# Patient Record
Sex: MALE
[De-identification: names, ages, dates, MRNs, and addresses within clinical notes are randomized per-mention and may not be internally consistent; named-entity substitution may affect disease eponyms.]

---

## 2020-06-17 PROBLEM — Z00.00 ENCOUNTER FOR PREVENTIVE HEALTH EXAMINATION: Status: ACTIVE | Noted: 2020-06-17

## 2020-06-18 ENCOUNTER — APPOINTMENT (OUTPATIENT)
Dept: ORTHOPEDIC SURGERY | Facility: CLINIC | Age: 35
End: 2020-06-18
Payer: OTHER MISCELLANEOUS

## 2020-06-18 VITALS — TEMPERATURE: 97.2 F

## 2020-06-18 VITALS — HEIGHT: 70 IN | BODY MASS INDEX: 32.93 KG/M2 | WEIGHT: 230 LBS

## 2020-06-18 VITALS — DIASTOLIC BLOOD PRESSURE: 90 MMHG | SYSTOLIC BLOOD PRESSURE: 149 MMHG | HEART RATE: 109 BPM

## 2020-06-18 DIAGNOSIS — R11.0 NAUSEA: ICD-10-CM

## 2020-06-18 PROCEDURE — 99204 OFFICE O/P NEW MOD 45 MIN: CPT

## 2020-06-18 NOTE — HISTORY OF PRESENT ILLNESS
[de-identified] : Patient is here for concussion evaluation. 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 He lost consciousness. He went to [facility] ER. He had staples put in his head. He has headache, dizziness, nausea, photophobia, misophonia, difficulty concentrating, increased fatigue, and an overall feeling of being out of it.  He is not taking pain medication. The pain is located on the posterior aspect of his head and on the outside of his right eye. He had an episode of vomiting this AM that consisted mainly of bile. Denies prior concussion.  He has not returned to work.\par \par I have personally reviewed today's intake form which details the patient's concussion history and symptoms at this time.\par \par The patient's past medical history, past surgical history, medications and allergies were reviewed by me today and documented accordingly. In addition, the patient's family and social history, which were noncontributory to this visit, were reviewed also. Intake form was reviewed.  The patient has no family history of arthritis.

## 2020-06-18 NOTE — DISCUSSION/SUMMARY
[de-identified] : Discussed findings of today's exam and possible causes of patient's pain.  Educated patient on their most probable diagnosis of concussion.  Reviewed possible courses of treatment, and we collaboratively decided best course of treatment at this time will include conservative management and continued rest. Patient is still very symptomatic at this time, with positive provocative testing on assessment today.  Advised the patient that continued physical and cognitive rest is indicated.  At this time recommend a course of oral prednisone with an appropriate taper (We discussed all possible side effects of this medication), patient advised to not take oral NSAIDs while on prednisone.  Patient will also be started on a course of oral antiemetic, prescription sent to the pharmacy for Zofran.  Patient has significant vestibular ocular deficits, recommend a course of vestibular therapy to address these dysfunctions.  Patient also has had pain with cervicothoracic paraspinal muscle spasm secondary to whiplash injury status post fall, recommend a course of physical therapy to address these dysfunctions.  Patient is complaining of other body parts that are bothering him, however these were not confirmed on his Worker's Compensation case, he is advised to notify his employer of these other injuries and I am happy to assess them once confirmed at future visits.  Patient is unable to return to work at this time due to his severe symptoms.  I recommend follow up in 1-2 weeks to reassess.  Patient is advised that I am a sports medicine provider and deal primarily with sport related concussion, if he continues to have severe symptoms and does not respond to the above treatment measures I would recommend neurology consultation at that time for further management of his concussion.  Patient appreciates and agrees with the current plan.\par \par This note was generated using dragon medical dictation software.  A reasonable effort has been made for proofreading its contents, but typos may still remain.  If there are any questions or points of clarification needed please notify my office.\par

## 2020-06-18 NOTE — PHYSICAL EXAM
[de-identified] : Constitutional: Well-nourished, well-developed, No acute distress\par Respiratory:  Good respiratory effort, no SOB\par Lymphatic: No regional lymphadenopathy, no lymphedema\par Psychiatric: Pleasant and normal affect, alert and oriented x3\par Skin: Clean dry and intact B/L UE/LE\par Musculoskeletal: normal except where as noted in regional exam\par \par Cervical Spine Exam\par APPEARANCE: no marked deformities or malalignment, normal curvature, good posture\par POSITIVE TENDERNESS: + Bilateral upper trapezius, levator scapula, rhomboid major, and rhomboid minor, + spasm noted in the same muscles.\par NONTENDER: no bony midline tenderness.\par ROM: limited in all planes, most notably in flexion and sidebending due to pain\par RESISTIVE TESTING: painful 4/5 resisted ext, bilateral sidebending, rotation and shoulder shrug , 5/5 flexion \par SPECIAL TESTS: neg Spurling's b/l\par Vasc: 2+ radial pulse b/l\par Neuro: C5 - T1 intact to motor, DTRs 2+/4 biceps, triceps, brachioradialis\par Sensation: Intact to light touch throughout b/l UE\par \par Thoracic Spine Exam:\par \par normal curvature and normal alignment. good posture. no midline bony tenderness, + marked spasm and associated tenderness of bilateral paraspinal and periscapular muscles.  ROM mildly limited in sidebending and rotation due to noted spasm\par \par Neuro:  + finger-to-nose testing, + Romberg, + balance error testing \par \par Vestibular-occular testing:  \par Horizontal Nystagmus:  Negative\par Vertical Nystagmus:  Negative\par Smooth Pursuit:  Abnormal\par Accommodation/Convergence:  NL, but + for reproduction of symptoms\par Thumb held out in front of face, head turn with eyes focused: + for reproduction of symptoms\par Hands held out in front with thumbs/hands locked together, trunk rotation with head fixed: + for reproduction of symptoms\par

## 2020-06-18 NOTE — RETURN TO WORK/SCHOOL
[FreeTextEntry1] : Aidan was seen today for evaluation of concussion and head/neck pain status post fall.  He is unable to return to work at this time and cannot travel to work.  He is being started on a course of treatment.  Please excuse him from work until reevaluated in 2 weeks.\par Thank you for your understanding.\par \par Sincerely,\par \par Noe Carbajal DO, ATC\par Primary Care Sports Medicine\par [facility] [place]\par

## 2020-07-02 ENCOUNTER — APPOINTMENT (OUTPATIENT)
Dept: ORTHOPEDIC SURGERY | Facility: CLINIC | Age: 35
End: 2020-07-02
Payer: OTHER MISCELLANEOUS

## 2020-07-02 VITALS — TEMPERATURE: 96.2 F

## 2020-07-02 DIAGNOSIS — R42 DIZZINESS AND GIDDINESS: ICD-10-CM

## 2020-07-02 PROCEDURE — 99214 OFFICE O/P EST MOD 30 MIN: CPT

## 2020-07-02 NOTE — RETURN TO WORK/SCHOOL
[FreeTextEntry1] : Aidan was seen today for reevaluation of his concussion.  He is still unable to travel to or perform any of his work duties due to the severity of his symptoms.  With the severity and multitude of his symptoms he is being transitioned to the care of a neurologist and a consultation is being arranged for him at this time.  He will remain out of work for at least the next 2 weeks while we obtain this neurology consultation for further management.\par Thank you for your understanding.\par \par Sincerely,\par \par Noe Carbajal DO, ATC\par Primary Care Sports Medicine\par [facility] [place]\par

## 2020-07-02 NOTE — DISCUSSION/SUMMARY
[de-identified] : Patient was seen today for reevaluation of concussion with cervical paraspinal muscle spasm secondary to work comp injury.  Patient is having a multitude of postconcussive symptoms including sensitivity to light, dizziness, nausea, and anxiety.  Patient is also newly developed black spots in his vision potentially development of floaters.  With his multitude of symptoms at this time I recommend an MRI of the head to evaluate for intracranial pathology, as well as MRA of the head/neck in order to rule out vertebral artery injury.  With the severity of the symptoms we discussed the risks/benefits of treating with oral corticosteroids, and patient would like to proceed with medication regimen at this time.  Patient has been prescribed a course of oral dexamethasone with an appropriate taper (We discussed all possible side effects of this medication), patient advised to not take oral NSAIDs while on prednisone.  Patient will continue his course of physical and vestibular therapy to address his multitude of pathology/symptoms.  At this time patient will also be transitioned to the care of a neurologist, he was advised that I am a sports medicine physician and I primarily manage sport related concussion, with this Worker's Compensation injury and the multitude and severity of his symptoms he would best be managed by a neurologist for continue management.  I can see the patient for follow-up of neck and back pain due to whiplash injury as needed.  Patient may follow-up over the phone regarding MRI/MRI results as soon as available, and he may review the results of the scans with his neurologist as well.  Patient will remain out of work at this time until assessed by neurology.  Patient appreciates and agrees with current plan.\par \par This note was generated using dragon medical dictation software.  A reasonable effort has been made for proofreading its contents, but typos may still remain.  If there are any questions or points of clarification needed please notify my office.

## 2020-07-02 NOTE — PHYSICAL EXAM
[de-identified] : Constitutional: Well-nourished, well-developed, No acute distress\par Respiratory:  Good respiratory effort, no SOB\par Lymphatic: No regional lymphadenopathy, no lymphedema\par Psychiatric: Pleasant and normal affect, alert and oriented x3\par Skin: Clean dry and intact B/L UE/LE\par Musculoskeletal: normal except where as noted in regional exam\par \par Cervical Spine Exam\par APPEARANCE: no marked deformities or malalignment, normal curvature, good posture\par POSITIVE TENDERNESS: + Bilateral upper trapezius, levator scapula, rhomboid major, and rhomboid minor, + spasm noted in the same muscles.\par NONTENDER: no bony midline tenderness.\par ROM: limited in all planes, most notably in flexion and sidebending due to pain\par RESISTIVE TESTING: painful 4/5 resisted ext, bilateral sidebending, rotation and shoulder shrug , 5/5 flexion \par SPECIAL TESTS: neg Spurling's b/l\par Vasc: 2+ radial pulse b/l\par Neuro: C5 - T1 intact to motor, DTRs 2+/4 biceps, triceps, brachioradialis\par Sensation: Intact to light touch throughout b/l UE\par \par Thoracic Spine Exam:\par \par normal curvature and normal alignment. good posture. no midline bony tenderness, + marked spasm and associated tenderness of bilateral paraspinal and periscapular muscles.  ROM mildly limited in sidebending and rotation due to noted spasm\par \par Neuro:  + finger-to-nose testing, + Romberg, + balance error testing \par \par Vestibular-occular testing:  \par Horizontal Nystagmus:  Negative\par Vertical Nystagmus:  Negative\par Smooth Pursuit:  Abnormal\par Accommodation/Convergence:  NL, but + for reproduction of symptoms\par Thumb held out in front of face, head turn with eyes focused: + for reproduction of symptoms\par Hands held out in front with thumbs/hands locked together, trunk rotation with head fixed: + for reproduction of symptoms\par

## 2020-07-02 NOTE — HISTORY OF PRESENT ILLNESS
[de-identified] : Patient is here for concussion follow up. He has attended PT/VT and his therapist reached out regarding concerning findings. He continues to have severe symptoms.  Patient has extreme sensitivity to light, nausea, dizziness, difficulty concentrating, as well as increased anxiety regarding his condition.  Patient is very concerned that he will not be able to get back to work with his multitude of symptoms.  Since last valuation patient has intermittently had development of black spots in his vision.  There has been no further injury. \par I have personally reviewed today's intake form which details the patient's concussion history and symptoms at this time.

## 2020-07-16 ENCOUNTER — APPOINTMENT (OUTPATIENT)
Dept: ORTHOPEDIC SURGERY | Facility: CLINIC | Age: 35
End: 2020-07-16
Payer: OTHER MISCELLANEOUS

## 2020-07-16 ENCOUNTER — FORM ENCOUNTER (OUTPATIENT)
Age: 35
End: 2020-07-16

## 2020-07-16 VITALS — TEMPERATURE: 96.5 F

## 2020-07-16 DIAGNOSIS — M62.838 OTHER MUSCLE SPASM: ICD-10-CM

## 2020-07-16 PROCEDURE — 99214 OFFICE O/P EST MOD 30 MIN: CPT

## 2020-07-16 NOTE — HISTORY OF PRESENT ILLNESS
[de-identified] : Patient is here for concussion follow up. He has a scheduled appointment with Neurology. He has not gone for his imaging studies and does not recall being told that he needed to have imaging done at his last visit. He has continued attending vestibular therapy. He took the steroids as recommended which has helped with nausea. He continues to have visual disturbances. \par I have personally reviewed today's intake form which details the patient's concussion history and symptoms at this time.

## 2020-07-16 NOTE — PHYSICAL EXAM
[de-identified] : Constitutional: Well-nourished, well-developed, No acute distress\par Respiratory:  Good respiratory effort, no SOB\par Lymphatic: No regional lymphadenopathy, no lymphedema\par Psychiatric: Pleasant and normal affect, alert and oriented x3\par Skin: Clean dry and intact B/L UE/LE\par Musculoskeletal: normal except where as noted in regional exam\par \par Cervical Spine Exam\par APPEARANCE: no marked deformities or malalignment, normal curvature, good posture\par POSITIVE TENDERNESS: + Bilateral upper trapezius, levator scapula, rhomboid major, and rhomboid minor, + spasm noted in the same muscles.\par NONTENDER: no bony midline tenderness.\par ROM: limited in all planes, most notably in flexion and sidebending due to pain\par RESISTIVE TESTING: painful 4/5 resisted ext, bilateral sidebending, rotation and shoulder shrug , 5/5 flexion \par SPECIAL TESTS: neg Spurling's b/l\par Vasc: 2+ radial pulse b/l\par Neuro: C5 - T1 intact to motor, DTRs 2+/4 biceps, triceps, brachioradialis\par Sensation: Intact to light touch throughout b/l UE\par \par Thoracic Spine Exam:\par \par normal curvature and normal alignment. good posture. no midline bony tenderness, + marked spasm and associated tenderness of bilateral paraspinal and periscapular muscles.  ROM mildly limited in sidebending and rotation due to noted spasm\par \par Neuro:  + finger-to-nose testing, + Romberg, + balance error testing \par \par Vestibular-occular testing:  \par Horizontal Nystagmus:  Negative\par Vertical Nystagmus:  Negative\par Smooth Pursuit:  Abnormal\par Accommodation/Convergence:  NL, but + for reproduction of symptoms\par Thumb held out in front of face, head turn with eyes focused: + for reproduction of symptoms\par Hands held out in front with thumbs/hands locked together, trunk rotation with head fixed: + for reproduction of symptoms\par

## 2020-07-16 NOTE — DISCUSSION/SUMMARY
[de-identified] : Patient was seen today for reevaluation and continued management of concussion with cervical paraspinal muscle spasm.  Patient continues to have significant symptoms.  He had some temporary mild relief with dexamethasone, but continues to have headache, neck stiffness, thoracic back pain, as well as intermittent floaters in his vision.  Patient has neurology consultation scheduled for 7/24/20, this provider will assume care of his concussion and further management of his related issues.  We are still awaiting Worker's Compensation authorization for MRI of the head and MRA of the neck, I advised the patient Worker's Comp. has upwards of 30 days to authorize imaging requests, but hopefully we will obtain these imaging studies prior to neurology consultation so he can review the results and discuss appropriate treatment recommendations based on findings with a neurologist next week.  Patient will continue his course of physical therapy.  Patient is still unable to return to work due to his audible medical issues that are ongoing.  Patient will continue his course of physical therapy to address cervical paraspinal muscle spasm.  Patient is advised I can see him back in 2 weeks for reassessment of his musculoskeletal issues, but he would be recommended to continue with the neurologist for further management of his concussion, dizziness, visual floaters, and other related issues.  Patient was given written instructions regarding all of these aspects of his treatment plan so he fully understood the plan today.  Patient appreciates and agrees with current plan.\par \par This note was generated using dragon medical dictation software.  A reasonable effort has been made for proofreading its contents, but typos may still remain.  If there are any questions or points of clarification needed please notify my office.

## 2020-07-16 NOTE — RETURN TO WORK/SCHOOL
[FreeTextEntry1] : Aidan was seen today for reevaluation of concussion, and cervical neck injury.  He is still unable to return to work at this time.  He will be reevaluated in 2 weeks.\par Thank you for your understanding.\par \par Sincerely,\par \par Noe Carbajal DO, ATC\par Primary Care Sports Medicine\par [facility] [place]\par

## 2020-07-24 ENCOUNTER — APPOINTMENT (OUTPATIENT)
Dept: NEUROLOGY | Facility: CLINIC | Age: 35
End: 2020-07-24
Payer: OTHER MISCELLANEOUS

## 2020-07-24 VITALS
BODY MASS INDEX: 34.07 KG/M2 | HEART RATE: 107 BPM | SYSTOLIC BLOOD PRESSURE: 142 MMHG | HEIGHT: 70 IN | WEIGHT: 238 LBS | DIASTOLIC BLOOD PRESSURE: 93 MMHG

## 2020-07-24 VITALS — TEMPERATURE: 97 F

## 2020-07-24 PROCEDURE — 99243 OFF/OP CNSLTJ NEW/EST LOW 30: CPT

## 2020-07-24 NOTE — HISTORY OF PRESENT ILLNESS
[FreeTextEntry1] : 35-year-old man provides history of a work related accident that occurred on June 4, 2020. 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 He was brought by ambulance to [facility] where a scalp laceration was sutured and he was discharged home. He was complaining of headaches afterwards and went to urgent care "[facility]" He returned to [facility] to have the staples removed one week after the accident. He claims he had brain imaging not sure if it was CT scan or MRI and was told "it was normal". No records from [facility] are currently available. Dr. Carbajal referred him for physical therapy for his neck and back pain. He has gained 8 pounds since his accident and wakes up feeling tired. He continues to have daily headaches and complains of hypersensitivity to light and "floaters". He complains of being forgetful. He complains of pain in his left hand claiming that was the hand he fell on. He denies any previous head injuries or concussions.

## 2020-07-24 NOTE — PHYSICAL EXAM
[FreeTextEntry1] : He is alert and oriented to month but not date. Short-term recall is markedly impaired being unable to recall any of 3 words after 3 minutes. He is able to spell but not reverse spelling of a 5 letter word. He was unable to calculate the number of nickels that equal one dollar. Visual fields are full to confrontation. Pupils are small, but constrict to light. He is unable to tolerate funduscopic examination because of photosensitivity. Extraocular movements are intact. He has diminished hearing to finger rub in the left ear. Ray radiates to the right ear. He has diminished vibration to a tuning fork placed on the left forehead compared to the right forehead. There is no weakness or atrophy of the limbs. Tendon reflexes are active and symmetrical plantar responses are flexor. Gait and coordination intact. Romberg negative.

## 2020-07-24 NOTE — REVIEW OF SYSTEMS
[Anxiety] : anxiety [As Noted in HPI] : as noted in HPI [Loss Of Hearing] : hearing loss [Negative] : Endocrine

## 2020-07-24 NOTE — CONSULT LETTER
[Dear  ___] : Dear  [unfilled], [Consult Letter:] : I had the pleasure of evaluating your patient, [unfilled]. [FreeTextEntry2] : Noe Carbajal, DO

## 2020-07-24 NOTE — ASSESSMENT
[FreeTextEntry1] : His cognitive impairment is out of proportion to the history obtained. He has findings on examination of a nonphysiologic sensory loss.\par \par I advised that he obtain authorization from his workers compensation carrier to undergo formal neuropsychological testing.\par \par He was told to bring in old records that were obtained at [facility] for my review. I find it unusual that after 2 months of physical therapy his symptoms have not improved.\par \par He has gained weight and wakes up feeling tired. There may be a component of sleep apnea contributing to his chronic headaches.\par \par He will return for followup in 2 months.

## 2020-07-27 ENCOUNTER — FORM ENCOUNTER (OUTPATIENT)
Age: 35
End: 2020-07-27

## 2020-07-28 ENCOUNTER — FORM ENCOUNTER (OUTPATIENT)
Age: 35
End: 2020-07-28

## 2020-07-30 ENCOUNTER — APPOINTMENT (OUTPATIENT)
Dept: ORTHOPEDIC SURGERY | Facility: CLINIC | Age: 35
End: 2020-07-30
Payer: OTHER MISCELLANEOUS

## 2020-07-30 VITALS — TEMPERATURE: 97.6 F

## 2020-07-30 PROCEDURE — 99213 OFFICE O/P EST LOW 20 MIN: CPT

## 2020-07-30 NOTE — DISCUSSION/SUMMARY
[de-identified] : Patient was seen today for reevaluation of persistent neck pain status post fall at work.  Since last evaluation patient was seen by neurology, he was advised to obtain emergency room records for the neurologist to review, and he is also recommended to undergo neuropsychological evaluation.  Patient has continued physical therapy since last evaluation, however he has little interval improvement with therapy thus far.  We obtained insurance authorization for MRI/MRA of the neck this week, patient was emailed the prescriptions, but could not open the email, so they will be provided to him by hand on paper today.  Patient is advised he should schedule these diagnostic studies for further evaluation.  Patient is advised that I am a sports medicine provider, I do not routinely manage prolonged concussion symptoms secondary to Worker's Compensation injury, he be recommended to continue management with his neurologist for this issue.  Depending on MRI results of the cervical spine patient may benefit from physiatry consultation for continued management of cervical neck pain without improvement with conservative therapies thus far.  If there are any surgical indications based on MRI results patient will be given a referral to my [facility].  Patient is advised he would likely need transition of his care to either physiatry and/or neurology for continue management of these chronic problems, his issues and the chronicity of his pain is beyond the scope of my sports medicine practice.  Patient appreciates and agrees with current plan.\par \par This note was generated using dragon medical dictation software.  A reasonable effort has been made for proofreading its contents, but typos may still remain.  If there are any questions or points of clarification needed please notify my office.

## 2020-07-30 NOTE — PHYSICAL EXAM
[de-identified] : Constitutional: Well-nourished, well-developed, No acute distress\par Respiratory:  Good respiratory effort, no SOB\par Lymphatic: No regional lymphadenopathy, no lymphedema\par Psychiatric: Pleasant and normal affect, alert and oriented x3\par Skin: Clean dry and intact B/L UE/LE\par Musculoskeletal: normal except where as noted in regional exam\par \par Cervical Spine Exam\par APPEARANCE: no marked deformities or malalignment, normal curvature, good posture\par POSITIVE TENDERNESS: + Bilateral upper trapezius, levator scapula, rhomboid major, and rhomboid minor, + spasm noted in the same muscles.\par NONTENDER: no bony midline tenderness.\par ROM: limited in all planes, most notably in flexion and sidebending due to pain\par RESISTIVE TESTING: painful 4/5 resisted ext, bilateral sidebending, rotation and shoulder shrug , 5/5 flexion \par SPECIAL TESTS: neg Spurling's b/l\par Vasc: 2+ radial pulse b/l\par Neuro: C5 - T1 intact to motor, DTRs 2+/4 biceps, triceps, brachioradialis\par Sensation: Intact to light touch throughout b/l UE\par \par Thoracic Spine Exam:\par \par normal curvature and normal alignment. good posture. no midline bony tenderness, + marked spasm and associated tenderness of bilateral paraspinal and periscapular muscles.  ROM mildly limited in sidebending and rotation due to noted spasm\par \par

## 2020-07-30 NOTE — RETURN TO WORK/SCHOOL
[FreeTextEntry1] : Aidan was seen today for re-evaluation of neck pain.  We have received authorization for his MRI/MRA of the neck for further evaluation of his injury.  He will obtain this study and follow up to discuss results and next steps in his treatment.  He is unable to return to work at this time and remains 100% temporarily disabled since time of injury.\par Thank you for your understanding.\par \par Sincerely,\par \par Noe Carbajal DO, ATC\par Primary Care Sports Medicine\par [facility] [place]\par

## 2020-07-30 NOTE — HISTORY OF PRESENT ILLNESS
[de-identified] : Patient is here for neck pain follow up. He has been attending PT. He states that he tripped over a rock while walking and exacerbated his symptoms. He does not recall when this fall occurred. He is in the process of scheduling his MRA. He saw the neurologist last week.

## 2020-08-13 ENCOUNTER — OUTPATIENT (OUTPATIENT)
Dept: OUTPATIENT SERVICES | Facility: HOSPITAL | Age: 35
LOS: 1 days | End: 2020-08-13
Payer: COMMERCIAL

## 2020-08-13 ENCOUNTER — RESULT REVIEW (OUTPATIENT)
Age: 35
End: 2020-08-13

## 2020-08-13 ENCOUNTER — APPOINTMENT (OUTPATIENT)
Dept: MRI IMAGING | Facility: CLINIC | Age: 35
End: 2020-08-13
Payer: OTHER MISCELLANEOUS

## 2020-08-13 ENCOUNTER — APPOINTMENT (OUTPATIENT)
Dept: ORTHOPEDIC SURGERY | Facility: CLINIC | Age: 35
End: 2020-08-13
Payer: OTHER MISCELLANEOUS

## 2020-08-13 DIAGNOSIS — M54.2 CERVICALGIA: ICD-10-CM

## 2020-08-13 DIAGNOSIS — S06.0X9A CONCUSSION WITH LOSS OF CONSCIOUSNESS OF UNSPECIFIED DURATION, INITIAL ENCOUNTER: ICD-10-CM

## 2020-08-13 DIAGNOSIS — R42 DIZZINESS AND GIDDINESS: ICD-10-CM

## 2020-08-13 PROCEDURE — 70551 MRI BRAIN STEM W/O DYE: CPT | Mod: 26

## 2020-08-13 PROCEDURE — 99213 OFFICE O/P EST LOW 20 MIN: CPT

## 2020-08-13 PROCEDURE — 70549 MR ANGIOGRAPH NECK W/O&W/DYE: CPT | Mod: 26

## 2020-08-13 PROCEDURE — 70551 MRI BRAIN STEM W/O DYE: CPT

## 2020-08-13 PROCEDURE — 70549 MR ANGIOGRAPH NECK W/O&W/DYE: CPT

## 2020-08-13 PROCEDURE — A9585: CPT

## 2020-08-13 RX ORDER — ONDANSETRON 4 MG/1
4 TABLET ORAL TWICE DAILY
Qty: 14 | Refills: 0 | Status: DISCONTINUED | COMMUNITY
Start: 2020-06-18 | End: 2020-08-13

## 2020-08-13 RX ORDER — PREDNISONE 5 MG/1
5 TABLET ORAL
Qty: 30 | Refills: 0 | Status: DISCONTINUED | COMMUNITY
Start: 2020-06-18 | End: 2020-08-13

## 2020-08-13 RX ORDER — DEXAMETHASONE 4 MG/1
4 TABLET ORAL
Qty: 6 | Refills: 0 | Status: DISCONTINUED | COMMUNITY
Start: 2020-07-02 | End: 2020-08-13

## 2020-08-26 ENCOUNTER — APPOINTMENT (OUTPATIENT)
Dept: PHYSICAL MEDICINE AND REHAB | Facility: CLINIC | Age: 35
End: 2020-08-26
Payer: OTHER MISCELLANEOUS

## 2020-08-26 VITALS
TEMPERATURE: 95.5 F | HEIGHT: 70 IN | BODY MASS INDEX: 33.5 KG/M2 | HEART RATE: 112 BPM | WEIGHT: 234 LBS | DIASTOLIC BLOOD PRESSURE: 94 MMHG | OXYGEN SATURATION: 96 % | SYSTOLIC BLOOD PRESSURE: 150 MMHG

## 2020-08-26 DIAGNOSIS — Z78.9 OTHER SPECIFIED HEALTH STATUS: ICD-10-CM

## 2020-08-26 DIAGNOSIS — Z80.9 FAMILY HISTORY OF MALIGNANT NEOPLASM, UNSPECIFIED: ICD-10-CM

## 2020-08-26 PROCEDURE — 99204 OFFICE O/P NEW MOD 45 MIN: CPT

## 2020-08-26 RX ORDER — CYCLOBENZAPRINE HYDROCHLORIDE 10 MG/1
10 TABLET, FILM COATED ORAL
Qty: 30 | Refills: 0 | Status: ACTIVE | COMMUNITY
Start: 2020-08-26 | End: 1900-01-01

## 2020-08-26 RX ORDER — CELECOXIB 200 MG/1
200 CAPSULE ORAL TWICE DAILY
Qty: 60 | Refills: 2 | Status: ACTIVE | COMMUNITY
Start: 2020-08-26 | End: 1900-01-01

## 2020-08-27 PROBLEM — Z78.9 NO PERTINENT PAST MEDICAL HISTORY: Status: RESOLVED | Noted: 2020-08-26 | Resolved: 2020-08-27

## 2020-08-27 PROBLEM — Z80.9 FAMILY HISTORY OF MALIGNANT NEOPLASM: Status: ACTIVE | Noted: 2020-08-26

## 2020-08-27 NOTE — HISTORY OF PRESENT ILLNESS
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 Since that day has had headaches, floaters, and back pain from low back to the neck. Pain is 7-9/10, intermittent, aching pain, radiates to the left arm, associated with paresthesia in the LUE, aggravated by lifting, carrying, pushing, pulling alleviated by rest. Is in PT currently for neck pain which has been helping. No imaging done for cervical spine. \par \aleah Went to hospital ([place]) sent home after no life threatening injuries found.

## 2020-08-27 NOTE — ASSESSMENT
[FreeTextEntry1] : No improvement in pain despite over 6 weeks of physician guided conservative management including PT and oral NSAIDs, will need MRI now to guide next step in management.\par

## 2020-08-27 NOTE — PHYSICAL EXAM
[Normal] : Oriented to person, place, and time, insight and judgement were intact and the affect was normal [5] : T1 small finger abduction 5/5 [3] : T1 small finger abduction 3/5 [Not Fit For Work] : Not fit for work [Physical Disability Temporary Total] : temporarily total disabled [FreeTextEntry1] : thoracic back pain bilaterally [de-identified] : no gross deformity, TTP over the left cervical paraspinals, ROM limited in all planes, positive Spurling's on the left, negative Martinez's\par

## 2020-09-01 ENCOUNTER — FORM ENCOUNTER (OUTPATIENT)
Age: 35
End: 2020-09-01

## 2020-09-10 ENCOUNTER — APPOINTMENT (OUTPATIENT)
Dept: ORTHOPEDIC SURGERY | Facility: CLINIC | Age: 35
End: 2020-09-10
Payer: OTHER MISCELLANEOUS

## 2020-09-10 VITALS — TEMPERATURE: 97.6 F

## 2020-09-10 DIAGNOSIS — H43.393 OTHER VITREOUS OPACITIES, BILATERAL: ICD-10-CM

## 2020-09-10 PROCEDURE — 99214 OFFICE O/P EST MOD 30 MIN: CPT

## 2020-09-10 NOTE — DISCUSSION/SUMMARY
[de-identified] : Patient was seen today for his multitude of issues following his Worker's Compensation injury.  Patient has continued his course of physical therapy, he has some mildly improved cervical range of motion, but continues to have significant stiffness, tenderness, and limited range of motion overall.  He continues to have left upper extremity weakness secondary to left cervical radiculopathy.  Patient was seen by physiatry and is still pending cervical MRI as previously ordered.  He would likely benefit from epidural steroid injection to address his left cervical radiculopathy.  Patient is also still having visual floaters, this issue was not specifically addressed by his neurologist, at this time recommend ophthalmology referral for further evaluation and management as patient already has normal MRA of the brain.  Patient is advised that he can follow-up as needed, but at this time he is no longer needed to be under the care of a sports medicine physician, I was the first provider to see him, but at this time he has cervical radiculopathy which is being managed by physiatry, he has symptoms related to his concussion including headaches and dizziness which are being managed by neurology, and he has been referred to ophthalmology for management of his persistent visual floaters.  Physiatry can also manage his paraspinal muscle spasm and neck pain which is the only remaining issue that I manage as part of my practice.  Patient appreciates and agrees with current plan.\par \par This note was generated using dragon medical dictation software.  A reasonable effort has been made for proofreading its contents, but typos may still remain.  If there are any questions or points of clarification needed please notify my office.

## 2020-09-10 NOTE — HISTORY OF PRESENT ILLNESS
[de-identified] : Patient is here for concussion follow up. He states that he has been attending PT. He is in the process of making an appointment with pain management. He had an FANNY performed last week which is why he did not make his scheduled appointment. He was recommended to request reports of his studies, and a script for glasses/a referral to see an optometrist. He states that he is about the same as he was at last evaluation. There has been no recent injury. \par I have personally reviewed today's intake form which details the patient's concussion history and symptoms at this time.

## 2020-09-10 NOTE — PHYSICAL EXAM
[de-identified] : Constitutional: Well-nourished, well-developed, No acute distress\par Respiratory:  Good respiratory effort, no SOB\par Lymphatic: No regional lymphadenopathy, no lymphedema\par Psychiatric: Pleasant and normal affect, alert and oriented x3\par Skin: Clean dry and intact B/L UE/LE\par Musculoskeletal: normal except where as noted in regional exam\par \par Cervical Spine Exam\par APPEARANCE: no marked deformities or malalignment, normal curvature, good posture\par POSITIVE TENDERNESS: + Bilateral upper trapezius, levator scapula, rhomboid major, and rhomboid minor, + spasm noted in the same muscles.\par NONTENDER: no bony midline tenderness.\par ROM: limited in all planes, most notably in flexion and sidebending due to pain\par RESISTIVE TESTING: painful 4/5 resisted ext, bilateral sidebending, rotation and shoulder shrug , 5/5 flexion; 3+/5 left shoulder flexion/abduction, left elbow flexion/extension, left wrist flexion/extension, and left hand \par SPECIAL TESTS: neg Spurling's b/l\par Vasc: 2+ radial pulse b/l\par Neuro: C5 - T1 intact to motor, DTRs 2+/4 biceps, triceps, brachioradialis\par Sensation: Intact to light touch throughout b/l UE\par \par Thoracic Spine Exam:\par \par normal curvature and normal alignment. good posture. no midline bony tenderness, + marked spasm and associated tenderness of bilateral paraspinal and periscapular muscles.  ROM mildly limited in sidebending and rotation due to noted spasm\par \par

## 2020-09-23 ENCOUNTER — APPOINTMENT (OUTPATIENT)
Dept: PHYSICAL MEDICINE AND REHAB | Facility: CLINIC | Age: 35
End: 2020-09-23

## 2020-09-29 ENCOUNTER — APPOINTMENT (OUTPATIENT)
Dept: NEUROLOGY | Facility: CLINIC | Age: 35
End: 2020-09-29
Payer: OTHER MISCELLANEOUS

## 2020-09-29 VITALS
SYSTOLIC BLOOD PRESSURE: 136 MMHG | DIASTOLIC BLOOD PRESSURE: 82 MMHG | BODY MASS INDEX: 35.07 KG/M2 | HEART RATE: 107 BPM | HEIGHT: 70 IN | WEIGHT: 245 LBS

## 2020-09-29 VITALS — TEMPERATURE: 96.8 F

## 2020-09-29 DIAGNOSIS — H53.9 UNSPECIFIED VISUAL DISTURBANCE: ICD-10-CM

## 2020-09-29 PROCEDURE — 99214 OFFICE O/P EST MOD 30 MIN: CPT

## 2020-09-30 ENCOUNTER — APPOINTMENT (OUTPATIENT)
Dept: PHYSICAL MEDICINE AND REHAB | Facility: CLINIC | Age: 35
End: 2020-09-30
Payer: OTHER MISCELLANEOUS

## 2020-09-30 VITALS
TEMPERATURE: 98.7 F | OXYGEN SATURATION: 96 % | HEART RATE: 115 BPM | DIASTOLIC BLOOD PRESSURE: 90 MMHG | SYSTOLIC BLOOD PRESSURE: 162 MMHG

## 2020-09-30 DIAGNOSIS — M54.6 PAIN IN THORACIC SPINE: ICD-10-CM

## 2020-09-30 DIAGNOSIS — G56.22 LESION OF ULNAR NERVE, LEFT UPPER LIMB: ICD-10-CM

## 2020-09-30 PROBLEM — H53.9 VISION DISTURBANCE: Status: ACTIVE | Noted: 2020-09-30

## 2020-09-30 PROCEDURE — 99213 OFFICE O/P EST LOW 20 MIN: CPT

## 2020-09-30 NOTE — PHYSICAL EXAM
[FreeTextEntry1] : Alert and oriented. Short term recall remains impaired. Visual field testing unreliable. 128 Hz tuning fork placed on left and right forehead is perceived diminished on left side. Gait and coordination intact.Rest of exam is unchanged.

## 2020-09-30 NOTE — ASSESSMENT
[FreeTextEntry1] : He was given referral to ophthalmologist, Dr. Jesus Arrieta who participates with Workers Compensation.\par \par Again request authorization for formal neuropsychologic testing.\par \par Return for follow-up after above studies obtained.

## 2020-09-30 NOTE — HISTORY OF PRESENT ILLNESS
[FreeTextEntry1] : 35-year-old man seen 2 months ago provided history of a work related accident that occurred on June 4, 2020. 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 He was brought by ambulance to [facility] where a scalp laceration was sutured and he was discharged home. He was complaining of headaches afterwards and went to urgent care "[facility]" He returned to [facility] to have the staples removed one week after the accident. He claims he had brain imaging not sure if it was CT scan or MRI and was told "it was normal". No records from [facility] are currently available. Dr. Carbajal referred him for physical therapy for his neck and back pain. He has gained 8 pounds since his accident and wakes up feeling tired. He continues to have daily headaches and complains of hypersensitivity to light and "floaters". He complains of being forgetful. He complains of pain in his left hand claiming that was the hand he fell on. He denies any previous head injuries or concussions.\par \par When seen 2 months ago I felt his cognitive impairment was out of proportion to the history obtained. He had findings on examination of a nonphysiologic sensory loss.\par \par I advised that he obtain authorization from his workers compensation carrier to undergo formal neuropsychological testing and he was told to bring in old records that were obtained at [facility] for my review. Both were not done.\par \par MRI /MRA brain ordered by Dr. Carbajal reported 2 weeks ago to be normal (reports in EHR).\par \par His complaints 4 months after his concussion have not changed.\par \par He has seen physiatrist who advised he have MRI C-spine. \par

## 2020-10-01 ENCOUNTER — APPOINTMENT (OUTPATIENT)
Dept: ORTHOPEDIC SURGERY | Facility: CLINIC | Age: 35
End: 2020-10-01

## 2020-10-04 NOTE — ASSESSMENT
[FreeTextEntry1] : No improvement in pain despite over 6 weeks of physician guided conservative management including PT and oral medications. Pending cervical MRI approval to guide next step in management.\par \par Interim:\par Stop NSAID d/t GI upset and elevated BP.  Advised to limit muscle relaxant to assess if there's a correlation to palpitation \par PT - modify exercise, avoid elbow flexion\par

## 2020-10-04 NOTE — PHYSICAL EXAM
[Normal] : Oriented to person, place, and time, insight and judgement were intact and the affect was normal [5] : T1 small finger abduction 5/5 [3] : T1 small finger abduction 3/5 [Not Fit For Work] : Not fit for work [Physical Disability Temporary Total] : temporarily total disabled [FreeTextEntry1] : thoracic back pain bilaterally [de-identified] : no gross deformity, TTP over the left cervical paraspinals, ROM limited in all planes, positive Spurling's on the left, negative Martinez's\par

## 2020-10-04 NOTE — HISTORY OF PRESENT ILLNESS
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  Went to [place].  Since last visit, he's been participating in PT with benefits.  Reports some GI upset, palpitation? + elevated BP with oral medication use - currently on celebrex and cyclobenzaprine.  Denies tarry stool. Endorses left arm radicular features from neck to hand, numbness present from left elbow to fingers. Denies new onset weakness.  Worse with overhead reaching, lifting.  Continues to have floaters - following up with neuro.\par \par Pending Cervical MRI

## 2020-10-09 ENCOUNTER — APPOINTMENT (OUTPATIENT)
Dept: ORTHOPEDIC SURGERY | Facility: CLINIC | Age: 35
End: 2020-10-09
Payer: OTHER MISCELLANEOUS

## 2020-10-09 PROCEDURE — 99442: CPT

## 2020-10-10 ENCOUNTER — RESULT REVIEW (OUTPATIENT)
Age: 35
End: 2020-10-10

## 2020-10-10 ENCOUNTER — OUTPATIENT (OUTPATIENT)
Dept: OUTPATIENT SERVICES | Facility: HOSPITAL | Age: 35
LOS: 1 days | End: 2020-10-10
Payer: COMMERCIAL

## 2020-10-10 ENCOUNTER — APPOINTMENT (OUTPATIENT)
Dept: MRI IMAGING | Facility: CLINIC | Age: 35
End: 2020-10-10
Payer: OTHER MISCELLANEOUS

## 2020-10-10 DIAGNOSIS — M54.12 RADICULOPATHY, CERVICAL REGION: ICD-10-CM

## 2020-10-10 PROCEDURE — 72141 MRI NECK SPINE W/O DYE: CPT | Mod: 26

## 2020-10-10 PROCEDURE — 72141 MRI NECK SPINE W/O DYE: CPT

## 2020-10-14 ENCOUNTER — APPOINTMENT (OUTPATIENT)
Dept: PHYSICAL MEDICINE AND REHAB | Facility: CLINIC | Age: 35
End: 2020-10-14
Payer: OTHER MISCELLANEOUS

## 2020-10-14 DIAGNOSIS — Z01.818 ENCOUNTER FOR OTHER PREPROCEDURAL EXAMINATION: ICD-10-CM

## 2020-10-14 PROCEDURE — 99214 OFFICE O/P EST MOD 30 MIN: CPT

## 2020-10-16 NOTE — HISTORY OF PRESENT ILLNESS
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  \par \aleah Was unable to participate in PT since last visit d/t multiple specialist appointments.  Pain radiates from left medial elbow and along ulnar distribution. Denies new onset weakness, ataxia, incontinence.\par conservative tx: ongoing PT, medications: celebrex (SE GI upset, elevated BP?), cyclobenzaprine (palpitation?)

## 2020-10-16 NOTE — PHYSICAL EXAM
[Normal] : Oriented to person, place, and time, insight and judgement were intact and the affect was normal [5] : T1 small finger abduction 5/5 [3] : C8 middle finger extension 3/5 [Not Fit For Work] : Not fit for work [Physical Disability Temporary Total] : temporarily total disabled [FreeTextEntry1] : thoracic back pain bilaterally\par +tinel's at left elbow [de-identified] : no gross deformity, TTP over the left cervical paraspinals, ROM limited in all planes, positive Spurling's on the left, negative Martinez's, tinel (elbow) pos on left\par

## 2020-10-16 NOTE — ASSESSMENT
[FreeTextEntry1] : Still in pain despite conservative management - PT and oral medication.  MRI reviewed.  Will try for interventional procedure\par \par Recommend:\par -May take advil PRN\par -continue with PT \par -EMG for upper extremity to r/o radiculopathy vs ulnar neuropathy\par - discussed risks/benefits of MARIANA \par -f/u in 4 weeks to assess ability to return to work

## 2020-10-16 NOTE — DATA REVIEWED
[MRI] : MRI [FreeTextEntry1] : Cervical MRI 1010/20\par FINDINGS: Cervical cord is normal in signal. Vertebral body heights are maintained. There is straightening of the normal cervical lordosis. Alignment is otherwise normal. There is diffuse low T1 marrow signal, nonspecific, but possibly secondary to anemia or marrow proliferative disorder. Correlate clinically and with laboratory values. \par C2-C3: No bulging or herniated intervertebral disc. No spinal canal stenosis or foraminal narrowing. \par C3-C4: Very small central disc protrusion without cord impingement. No spinal canal stenosis or foraminal narrowing. \par C4-C5: Small central-right paracentral disc protrusion which effaces the anterior thecal sac. No spinal canal stenosis. Mild right foraminal narrowing. \par C5-C6: Minimal disc bulge with uncovertebral spurring. No spinal canal stenosis or foraminal narrowing. \par C6-C7: Minimal disc bulge with uncovertebral spurring which is greater on the left. Mild left foraminal narrowing. No spinal canal stenosis. \par C7-T1: No bulging or herniated intervertebral disc. No spinal canal stenosis or foraminal narrowing. \par There is no paraspinal muscle atrophy or edema. \par \par IMPRESSION: Mild cervical spondylosis, as above. Normal cord signal. No spinal canal stenosis. \par Diffuse T1 marrow signal alteration, nonspecific, but possibly secondary to anemia or marrow proliferative disorder. Correlate clinically and with laboratory values.

## 2020-11-25 ENCOUNTER — APPOINTMENT (OUTPATIENT)
Dept: PHYSICAL MEDICINE AND REHAB | Facility: CLINIC | Age: 35
End: 2020-11-25
Payer: OTHER MISCELLANEOUS

## 2020-11-25 VITALS
HEART RATE: 115 BPM | TEMPERATURE: 96.5 F | SYSTOLIC BLOOD PRESSURE: 164 MMHG | OXYGEN SATURATION: 94 % | DIASTOLIC BLOOD PRESSURE: 96 MMHG

## 2020-11-25 DIAGNOSIS — M54.12 RADICULOPATHY, CERVICAL REGION: ICD-10-CM

## 2020-11-25 PROCEDURE — 99213 OFFICE O/P EST LOW 20 MIN: CPT

## 2020-11-29 NOTE — PHYSICAL EXAM
[Normal] : Oriented to person, place, and time, insight and judgement were intact and the affect was normal [No Restrictions] : No work restrictions [Fit For Work] : fit for work [de-identified] : no gross deformity, TTP over the left cervical paraspinals, ROM limited in all planes, positive Spurling's on the left, negative Martinez's\par

## 2020-11-29 NOTE — HISTORY OF PRESENT ILLNESS
[FreeTextEntry1] : Patient is follow up and reports improvement in pain. Feeling better now. Still having a left sided pain. Cervical MARIANA was requested but pending approval.

## 2020-11-29 NOTE — ASSESSMENT
[FreeTextEntry1] : Return to work without restrictions. Still having some pain and would like to try cervical MARIANA. Left C7/T1 ILESI.

## 2020-12-04 ENCOUNTER — APPOINTMENT (OUTPATIENT)
Dept: NEUROLOGY | Facility: CLINIC | Age: 35
End: 2020-12-04
Payer: OTHER MISCELLANEOUS

## 2020-12-04 VITALS
BODY MASS INDEX: 35.22 KG/M2 | SYSTOLIC BLOOD PRESSURE: 131 MMHG | HEIGHT: 70 IN | WEIGHT: 246 LBS | DIASTOLIC BLOOD PRESSURE: 87 MMHG | HEART RATE: 101 BPM

## 2020-12-04 VITALS — TEMPERATURE: 97.3 F

## 2020-12-04 DIAGNOSIS — S06.0X9A CONCUSSION WITH LOSS OF CONSCIOUSNESS OF UNSPECIFIED DURATION, INITIAL ENCOUNTER: ICD-10-CM

## 2020-12-04 PROCEDURE — 99213 OFFICE O/P EST LOW 20 MIN: CPT

## 2020-12-04 PROCEDURE — 99072 ADDL SUPL MATRL&STAF TM PHE: CPT

## 2020-12-05 PROBLEM — S06.0X9A CONCUSSION: Status: ACTIVE | Noted: 2020-06-18

## 2020-12-05 NOTE — PHYSICAL EXAM
[FreeTextEntry1] : Alert and oriented. No cognitive or communication deficits. Visual fields are full to confrontation. Pupils equal and constrict to light. Extraocular movements intact. No facial asymmetry. Hearing intact to finger rub.  Neck is supple. No bruits heard. No weakness or sensory deficits. Tendon reflexes are all active and symmetric. Plantars are flexor. Gait and coordination intact. Heart sounds are normal. No murmurs heard.\par

## 2020-12-05 NOTE — HISTORY OF PRESENT ILLNESS
[FreeTextEntry1] : 35 yr-old man with hx of post concussion syndrome following a work related injury 6 months ago returns today desirous of returning to work. His physiatrist felt he can return to full duty as a  with no restrictions.